# Patient Record
Sex: MALE
[De-identification: names, ages, dates, MRNs, and addresses within clinical notes are randomized per-mention and may not be internally consistent; named-entity substitution may affect disease eponyms.]

---

## 2022-01-11 ENCOUNTER — APPOINTMENT (OUTPATIENT)
Dept: BURN CARE | Facility: CLINIC | Age: 52
End: 2022-01-11

## 2022-06-22 ENCOUNTER — FORM ENCOUNTER (OUTPATIENT)
Age: 52
End: 2022-06-22

## 2022-07-08 PROBLEM — Z00.00 ENCOUNTER FOR PREVENTIVE HEALTH EXAMINATION: Status: ACTIVE | Noted: 2022-07-08

## 2022-07-29 ENCOUNTER — APPOINTMENT (OUTPATIENT)
Dept: ORTHOPEDIC SURGERY | Facility: CLINIC | Age: 52
End: 2022-07-29

## 2022-07-29 VITALS — BODY MASS INDEX: 32.58 KG/M2 | HEIGHT: 69 IN | WEIGHT: 220 LBS

## 2022-07-29 PROCEDURE — 99213 OFFICE O/P EST LOW 20 MIN: CPT

## 2022-07-29 PROCEDURE — 99072 ADDL SUPL MATRL&STAF TM PHE: CPT

## 2022-07-29 NOTE — IMAGING
[de-identified] : L elbow:\par Tender at the ulna nerve\par Pain with flexion\par +tinels at the ulna nerve\par +hyperflexion test\par \par L hand:\par decreased sensation in the ulna nerve distribution\par \par \par L wrist:\par Tender TFCC\par Pain with pronation/supination\par Decreased wrist ROM\par +TFCC grind\par

## 2022-07-29 NOTE — ASSESSMENT
[FreeTextEntry1] : The patient was advised of the diagnosis.  The natural history of the pathology was explained in full to the patient in layman's terms. We discussed the nature of the nerve as an electrical cable and what happens to the nerve in cubitaal tunnel syndrome.  We discussed that treatment for night symptoms included night bracing.  We discussed the use of nerve testing in cases when diagnosis was in doubt or for confirmation to exclude alternate pathology.  We discussed that if symptoms were 24/7 surgery was recommended to give the nerve the best chance to recover but that once symptoms were constant, the nerve may not recover even with surgery.  We discussed that if left alone the nerve progression could worsen and that treatment was indicated to prevent progression of nerve compression.  The longer the nerve is left, the more likely to cause worsening irreversible damage.  All questions were answered.  The risks and benefits of surgical and non-surgical treatment alternatives were explained in full to the patient.\par   Right now surgery has been denied.  He would like to move ahead with surgery.  We need authorization for surgery.  It is currently being repealed.\par \par we reviewed the anatomy, pathology, and treatment options for TFCC tears. We discussed that most of the TFCC is avascular and tears are slow to heal if at all but that surgical results are not guaranteed due to the poor healing capacity of the structure.  Even at surgery, most tears cannot be repaired, but are merely debrided.  We discussed the use of nsaids, bracing, rest, local modalities, injection and surgery in the treatment of TFCC tears. At this point, the patient will proceed with non-operative treatment.\par   The patient does not wish for injection at this time.  He is going to hold off until his next visit.  He is going to try the cream and continue using the wrist widget.  If it still bothers him at his next visit he will consider an injection.

## 2022-07-29 NOTE — HISTORY OF PRESENT ILLNESS
[Work related] : work related [] : yes [de-identified] : Patient comes in for follow-up of his wrist injury.  He is still having some pain.  He said 3 days ago started using some topical cream which she finds has been helping with the pain.  His burn has resolved so he is able to use the wrist widget.  His surgery for the cubital tunnel release has been denied and is currently being repealed by his . [FreeTextEntry3] : 12/01/2020

## 2022-07-29 NOTE — WORK
[Partial] : partial [Does not reveal pre-existing condition(s) that may affect treatment/prognosis] : does not reveal pre-existing condition(s) that may affect treatment/prognosis [Can return to work without limitations on ______] : can return to work without limitations on [unfilled] [I provided the services listed above] :  I provided the services listed above. [FreeTextEntry1] : fair [FreeTextEntry3] : lg

## 2022-09-13 ENCOUNTER — APPOINTMENT (OUTPATIENT)
Dept: ORTHOPEDIC SURGERY | Facility: CLINIC | Age: 52
End: 2022-09-13

## 2022-11-11 ENCOUNTER — APPOINTMENT (OUTPATIENT)
Dept: ORTHOPEDIC SURGERY | Facility: CLINIC | Age: 52
End: 2022-11-11

## 2022-11-11 PROCEDURE — 99072 ADDL SUPL MATRL&STAF TM PHE: CPT

## 2022-11-11 PROCEDURE — 20606 DRAIN/INJ JOINT/BURSA W/US: CPT

## 2022-11-11 PROCEDURE — 20550 NJX 1 TENDON SHEATH/LIGAMENT: CPT | Mod: F3

## 2022-11-11 PROCEDURE — 99214 OFFICE O/P EST MOD 30 MIN: CPT | Mod: 25

## 2022-11-11 NOTE — ASSESSMENT
[FreeTextEntry1] : The patient was advised of the diagnosis.  The natural history of the pathology was explained in full to the patient in layman's terms. We discussed the nature of the nerve as an electrical cable and what happens to the nerve in cubitaal tunnel syndrome.  We discussed that treatment for night symptoms included night bracing.  We discussed the use of nerve testing in cases when diagnosis was in doubt or for confirmation to exclude alternate pathology.  We discussed that if symptoms were 24/7 surgery was recommended to give the nerve the best chance to recover but that once symptoms were constant, the nerve may not recover even with surgery.  We discussed that if left alone the nerve progression could worsen and that treatment was indicated to prevent progression of nerve compression.  The longer the nerve is left, the more likely to cause worsening irreversible damage.  All questions were answered.  The risks and benefits of surgical and non-surgical treatment alternatives were explained in full to the patient.\par   Right now surgery has been denied.  He would like to move ahead with surgery.  We need authorization for surgery.  It is currently being repealed.\par \par we reviewed the anatomy, pathology, and treatment options for TFCC tears. We discussed that most of the TFCC is avascular and tears are slow to heal if at all but that surgical results are not guaranteed due to the poor healing capacity of the structure.  Even at surgery, most tears cannot be repaired, but are merely debrided.  We discussed the use of nsaids, bracing, rest, local modalities, injection and surgery in the treatment of TFCC tears. At this point, the patient will proceed with non-operative treatment.\par The patient opted for injection today.  I went through risks benefits alternatives of the injection with the patient.  He understood and wished to move forward with it.  Under sterile conditions the area was cleaned with Betadine and alcohol, sprayed with ethyl chloride, and using ultrasound guidance the 6 U portal was visualized, an injection of 3 cc total, 0.5 cc lidocaine and 2.5 cc of dexamethasone was injected into the 6 U portal.  The patient tolerated the injection well.\par \par My impression is that this patient has stenosing tenosynovitis of the finger, more commonly known as a trigger finger.  I recommended that the patient undergo a trigger finger injection. The risks, benefits, and alternatives were discussed with the patient in detail. This included (but was not limited to) pain, infection, subcutaneous atrophy, skin depigmentation, elevated glucose etc... The patient agreed to undergo a steroid injection. Under informed consent and sterile conditions, 0.1 cc of 1% plain lidocaine  and 0.9 cc of dexamethasone was precisely injected into the patient's finger. A sterile Band-Aid was placed.  It is my hope that this significantly alleviates the patient's symptoms.\par \par   Patient opted for injections of both the ring and small fingers.  He tolerated both injections well.  He will follow up in 1 month.

## 2022-11-11 NOTE — IMAGING
[de-identified] : L elbow:\par Tender at the ulna nerve\par Pain with flexion\par +tinels at the ulna nerve\par +hyperflexion test\par \par L hand:\par decreased sensation in the ulna nerve distribution\par \par \par L wrist:\par Tender TFCC\par Pain with pronation/supination\par Decreased wrist ROM\par +TFCC grind\par \par L hand: \par Mild swelling \par Tender 4th and 5th A1 pulley \par Decreased ring ROM \par +ring and small triggering\par

## 2022-11-11 NOTE — HISTORY OF PRESENT ILLNESS
[Work related] : work related [] : yes [de-identified] : Patient comes in for follow-up of his wrist injury. he says the wrist is doing relatively well when uses wrist widget.  It still bothers him though.  He has new complaints of locking in the ring and small fingers.  The fingers gets stuck down.  In addition the numbness and tingling is still bothering him.  It was denied to move forward with surgical intervention but this is being appealed. [FreeTextEntry3] : 12/01/2020

## 2022-11-11 NOTE — REASON FOR VISIT
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

## 2022-12-13 ENCOUNTER — APPOINTMENT (OUTPATIENT)
Dept: ORTHOPEDIC SURGERY | Facility: CLINIC | Age: 52
End: 2022-12-13

## 2022-12-13 DIAGNOSIS — S63.592A OTHER SPECIFIED SPRAIN OF LEFT WRIST, INITIAL ENCOUNTER: ICD-10-CM

## 2022-12-13 DIAGNOSIS — M65.332 TRIGGER FINGER, LEFT MIDDLE FINGER: ICD-10-CM

## 2022-12-13 DIAGNOSIS — G56.22 LESION OF ULNAR NERVE, LEFT UPPER LIMB: ICD-10-CM

## 2022-12-13 DIAGNOSIS — M65.352 TRIGGER FINGER, LEFT LITTLE FINGER: ICD-10-CM

## 2022-12-13 PROCEDURE — 99072 ADDL SUPL MATRL&STAF TM PHE: CPT

## 2022-12-13 PROCEDURE — 20550 NJX 1 TENDON SHEATH/LIGAMENT: CPT | Mod: F3

## 2022-12-13 PROCEDURE — 99214 OFFICE O/P EST MOD 30 MIN: CPT | Mod: 25

## 2022-12-13 NOTE — REASON FOR VISIT
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

## 2022-12-13 NOTE — IMAGING
[de-identified] : L elbow:\par Tender at the ulna nerve\par Pain with flexion\par +tinels at the ulna nerve\par +hyperflexion test\par \par L hand:\par decreased sensation in the ulna nerve distribution\par \par \par L wrist:\par Tender TFCC\par Pain with pronation/supination\par Decreased wrist ROM\par +TFCC grind\par \par L hand: \par Mild swelling \par Tender 3rd, 4th A1 pulley \par Decreased ring ROM \par +ring and long triggering\par

## 2022-12-13 NOTE — HISTORY OF PRESENT ILLNESS
[de-identified] : Patient's a wrist is doing somewhat better.  The numbness and tingling is doing okay as well.  His main complaint is the ring finger which is getting stuck on him.  The long finger is starting to get stuck on him as well.  The small fingers better after the injection.  He does not have other complaints today. [Work related] : work related [] : yes [FreeTextEntry3] : 12/01/2020

## 2022-12-13 NOTE — ASSESSMENT
[FreeTextEntry1] : My impression is that this patient has stenosing tenosynovitis of the finger, more commonly known as a trigger finger.  I recommended that the patient undergo a trigger finger injection. The risks, benefits, and alternatives were discussed with the patient in detail. This included (but was not limited to) pain, infection, subcutaneous atrophy, skin depigmentation, elevated glucose etc... The patient agreed to undergo a steroid injection. Under informed consent and sterile conditions, 0.1 cc of 1% plain lidocaine  and 0.9 cc of dexamethasone was precisely injected into the patient's finger. A sterile Band-Aid was placed.  It is my hope that this significantly alleviates the patient's symptoms.\par \par   Patient opted for injections of both the ring and long finger flexor tendon sheaths.    This is in 2nd injection for the ring finger. He tolerated both injections well.  He will follow up in 1 month.

## 2023-01-25 ENCOUNTER — APPOINTMENT (OUTPATIENT)
Dept: ORTHOPEDIC SURGERY | Facility: CLINIC | Age: 53
End: 2023-01-25
Payer: OTHER MISCELLANEOUS

## 2023-01-25 PROCEDURE — 99072 ADDL SUPL MATRL&STAF TM PHE: CPT

## 2023-01-25 PROCEDURE — 20550 NJX 1 TENDON SHEATH/LIGAMENT: CPT | Mod: F3

## 2023-01-25 PROCEDURE — 99213 OFFICE O/P EST LOW 20 MIN: CPT | Mod: 25

## 2023-01-26 NOTE — ASSESSMENT
[FreeTextEntry1] : My impression is that this patient has stenosing tenosynovitis of the finger, more commonly known as a trigger finger.  I recommended that the patient undergo a trigger finger injection. The risks, benefits, and alternatives were discussed with the patient in detail. This included (but was not limited to) pain, infection, subcutaneous atrophy, skin depigmentation, elevated glucose etc... The patient agreed to undergo a steroid injection. Under informed consent and sterile conditions, 0.1 cc of 1% plain lidocaine  and 0.9 cc of dexamethasone was precisely injected into the patient's finger. A sterile Band-Aid was placed.  It is my hope that this significantly alleviates the patient's symptoms.\par \par   Patient opted for I 3rd injection injections of the ring finger flexor tendon sheaths.    He will follow up in 1 month.

## 2023-01-26 NOTE — IMAGING
[de-identified] : L elbow:\par Tender at the ulna nerve\par Pain with flexion\par +tinels at the ulna nerve\par +hyperflexion test\par \par L hand:\par decreased sensation in the ulna nerve distribution\par \par \par L wrist:\par minmally Tender TFCC\par minimal Pain with pronation/supination\par full wrist ROM\par +TFCC grind\par \par L hand: \par Mild swelling \par Tender 4th A1 pulley \par Decreased ring ROM \par +ring triggering\par

## 2023-01-26 NOTE — HISTORY OF PRESENT ILLNESS
[Work related] : work related [] : yes [de-identified] : Patient's a wrist is doing somewhat better.  The numbness and tingling is doing okay as well.  His main complaint is the ring finger which is getting stuck on him.  The long finger Is better.  He does not have other complaints today.\par \par  [FreeTextEntry3] : 12/01/2020

## 2023-02-22 ENCOUNTER — APPOINTMENT (OUTPATIENT)
Dept: ORTHOPEDIC SURGERY | Facility: CLINIC | Age: 53
End: 2023-02-22
Payer: OTHER MISCELLANEOUS

## 2023-02-22 DIAGNOSIS — M65.342 TRIGGER FINGER, LEFT RING FINGER: ICD-10-CM

## 2023-02-22 PROCEDURE — 99072 ADDL SUPL MATRL&STAF TM PHE: CPT

## 2023-02-22 PROCEDURE — 99214 OFFICE O/P EST MOD 30 MIN: CPT

## 2023-02-22 NOTE — WORK
[Partial] : partial [Does not reveal pre-existing condition(s) that may affect treatment/prognosis] : does not reveal pre-existing condition(s) that may affect treatment/prognosis [Can return to work without limitations on ______] : can return to work without limitations on [unfilled] [I provided the services listed above] :  I provided the services listed above. [FreeTextEntry3] : lg [FreeTextEntry1] : fair

## 2023-02-22 NOTE — HISTORY OF PRESENT ILLNESS
[Work related] : work related [] : yes [de-identified] : Patient comes in with complaints of locking of the ring finger.  He says he is not working on trying to make a fist.  He says he cannot make a full fist now.  He says the trigger finger is getting worse.  He has had 3 injections.  He still having numbness and tingling in the small finger and the ring finger.  He is working on getting that case established.\par  [FreeTextEntry3] : 12/01/2020

## 2023-02-22 NOTE — IMAGING
[de-identified] : L hand: \par Mild swelling \par Tender 4th A1 pulley \par Decreased ring ROM \par +ring triggering\par

## 2023-03-13 ENCOUNTER — FORM ENCOUNTER (OUTPATIENT)
Age: 53
End: 2023-03-13